# Patient Record
Sex: FEMALE | Race: AMERICAN INDIAN OR ALASKA NATIVE | NOT HISPANIC OR LATINO | Employment: UNEMPLOYED | URBAN - METROPOLITAN AREA
[De-identification: names, ages, dates, MRNs, and addresses within clinical notes are randomized per-mention and may not be internally consistent; named-entity substitution may affect disease eponyms.]

---

## 2021-05-18 ENCOUNTER — OFFICE VISIT (OUTPATIENT)
Dept: DERMATOLOGY | Facility: CLINIC | Age: 56
End: 2021-05-18
Payer: COMMERCIAL

## 2021-05-18 VITALS — WEIGHT: 123 LBS | BODY MASS INDEX: 23.22 KG/M2 | HEIGHT: 61 IN

## 2021-05-18 DIAGNOSIS — L81.9 HYPOPIGMENTATION: Primary | ICD-10-CM

## 2021-05-18 DIAGNOSIS — L21.9 SEBORRHEIC DERMATITIS: ICD-10-CM

## 2021-05-18 PROCEDURE — 99204 OFFICE O/P NEW MOD 45 MIN: CPT | Performed by: STUDENT IN AN ORGANIZED HEALTH CARE EDUCATION/TRAINING PROGRAM

## 2021-05-18 RX ORDER — KETOCONAZOLE 20 MG/G
CREAM TOPICAL
Qty: 60 G | Refills: 5 | Status: SHIPPED | OUTPATIENT
Start: 2021-05-18

## 2021-05-18 RX ORDER — LEVOCETIRIZINE DIHYDROCHLORIDE 5 MG/1
5 TABLET, FILM COATED ORAL
COMMUNITY
Start: 2021-03-15

## 2021-05-18 RX ORDER — ESTRADIOL 0.1 MG/G
1 CREAM VAGINAL
COMMUNITY
Start: 2020-08-20 | End: 2021-08-20

## 2021-05-18 RX ORDER — FLUTICASONE PROPIONATE 50 MCG
SPRAY, SUSPENSION (ML) NASAL
COMMUNITY
Start: 2021-04-19

## 2021-05-18 RX ORDER — TACROLIMUS 1 MG/G
OINTMENT TOPICAL
Qty: 100 G | Refills: 5 | Status: SHIPPED | OUTPATIENT
Start: 2021-05-18

## 2021-05-18 RX ORDER — TRIAMCINOLONE ACETONIDE 0.25 MG/G
CREAM TOPICAL
Qty: 80 G | Refills: 0 | Status: SHIPPED | OUTPATIENT
Start: 2021-05-18

## 2021-05-18 RX ORDER — DULOXETIN HYDROCHLORIDE 20 MG/1
CAPSULE, DELAYED RELEASE ORAL
COMMUNITY
Start: 2021-04-19

## 2021-05-18 NOTE — PATIENT INSTRUCTIONS
Assessment and Plan:  Based on a thorough discussion of this condition and the management approach to it (including a comprehensive discussion of the known risks, side effects and potential benefits of treatment), the patient (family) agrees to implement the following specific plan: Favor hypopigmentation not depigmentation, given distribution 2/2 sebborrhea   · We recommend starting to use OTC sunscreen SPF 30+ liberally to minimize difference in skin pigmentation  · Triamcinolone 0 025% cream - use twice daily for 2 weeks  · Start ketoconazole 1% cream- start use daily  · Tacrolimus 0 1% ointment - will start PA process; Use on face once daily once received  Normal to have burning initially with use     · Follow up in 8 weeks   ·

## 2021-05-18 NOTE — PROGRESS NOTES
[facility]     Patient Name: Harshad Valdez  Encounter Date: 05/18/2021     Have you been cared for by a [facility] in the last 3 years and, if so, which one? No    · Have you traveled outside of the [address] [place] [address] in the past 3 months or outside of the [facility] area in the last 2 weeks? No     May we call your Preferred Phone number to discuss your specific medical information? Yes     May we leave a detailed message that includes your specific medical information? Yes      Today's Chief Concerns:   Concern #1:  Spot on face    Concern #2:      Past Medical History:  Have you personally ever had or currently have any of the following? · Skin cancer (such as Melanoma, Basal Cell Carcinoma, Squamous Cell Carcinoma? (If Yes, please provide more detail)- No  · Eczema: No  · Psoriasis: No  · HIV/AIDS: No  · Hepatitis B or C: No  · Tuberculosis: No  · Systemic Immunosuppression such as Diabetes, Biologic or Immunotherapy, Chemotherapy, Organ Transplantation, Bone Marrow Transplantation (If YES, please provide more detail): No  · Radiation Treatment (If YES, please provide more detail): No  · Any other major medical conditions/concerns? (If Yes, which types)- No    Social History:     What is/was your primary occupation? Unemployed      What are your hobbies/past-times? Walking     Family History:  Have any of your "first degree relatives" (parent, brother, sister, or child) had any of the following       · Skin cancer such as Melanoma or Merkel Cell Carcinoma or Pancreatic Cancer? No  · Eczema, Asthma, Hay Fever or Seasonal Allergies: No  · Psoriasis or Psoriatic Arthritis: No  · Do any other medical conditions seem to run in your family? If Yes, what condition and which relatives?   No    Current Medications:       Current Outpatient Medications:     DULoxetine (Cymbalta) 20 mg capsule, 1 po every other day, Disp: , Rfl:     estradiol (ESTRACE) 0 1 mg/g vaginal cream, Insert 1 g into the vagina, Disp: , Rfl:     fluticasone (FLONASE) 50 mcg/act nasal spray, 2 sprays each nostril as needed for allergies, Disp: , Rfl:     levocetirizine (XYZAL) 5 MG tablet, Take 5 mg by mouth, Disp: , Rfl:       Review of Systems:  Have you recently had or currently have any of the following? If YES, what are you doing for the problem? · Fever, chills or unintended weight loss: No  · Sudden loss or change in your vision: No  · Nausea, vomiting or blood in your stool: No  · Painful or swollen joints: No  · Wheezing or cough: No  · Changing mole or non-healing wound: No  · Nosebleeds: No  · Excessive sweating: No  · Easy or prolonged bleeding? No  · Over the last 2 weeks, how often have you been bothered by the following problems? · Taking little interest or pleasure in doing things: 1 - Not at All  · Feeling down, depressed, or hopeless: 1 - Not at All  · Rapid heartbeat with epinephrine:  No    · FEMALES ONLY:    · Are you pregnant or planning to become pregnant? No  · Are you currently or planning to be nursing or breast feeding? No    · Any known allergies? Allergies   Allergen Reactions    Penicillin G Rash   ·       Physical Exam:     Was a chaperone (Derm Clinical Assistant) present throughout the entire Physical Exam? Yes     Did the Dermatology Team specifically  the patient on the importance of a Full Skin Exam to be sure that nothing is missed clinically?  Yes}  o Did the patient ultimately request or accept a Full Skin Exam?  NO  o Did the patient specifically refuse to have the areas "under-the-bra" examined by the Dermatologist? Christi Rose  o Did the patient specifically refuse to have the areas "under-the-underwear" examined by the Dermatologist? YES    CONSTITUTIONAL:   Vitals:    05/18/21 1414   Weight: 55 8 kg (123 lb)   Height: 5' 1" (1 549 m)       PSYCH: Normal mood and affect  EYES: Normal conjunctiva  ENT: Normal lips and oral mucosa  CARDIOVASCULAR: No edema  RESPIRATORY: Normal respirations  HEME/LYMPH/IMMUNO:  No regional lymphadenopathy except as noted below in "ASSESSMENT AND PLAN BY DIAGNOSIS"    SKIN:  FULL ORGAN SYSTEM EXAM  Hair, Scalp, Ears, Face Normal except as noted below in Assessment     Assessment and Plan by Diagnosis:    History of Present Condition:     Duration:  How long has this been an issue for you?    o  Face    Location Affected:  Where on the body is this affecting you?    o  15 months    Quality:  Is there any bleeding, pain, itch, burning/irritation, or redness associated with the skin lesion?    o  Denies    Severity:  Describe any bleeding, pain, itch, burning/irritation, or redness on a scale of 1 to 10 (with 10 being the worst)  o  Denies    Timing:  Does this condition seem to be there pretty constantly or do you notice it more at specific times throughout the day?    o  Denies   Context:  Have you ever noticed that this condition seems to be associated with specific activities you do?    o  Denies    Modifying Factors:    o Anything that seems to make the condition worse?    -  Denies   o What have you tried to do to make the condition better?    -  hydroquinone spf 15    Associated Signs and Symptoms:  Does this skin lesion seem to be associated with any of the following:  o  SL AMB DERM SIGNS AND SYMPTOMS: Prevent you from things you enjoy doing         1  HYPOPIGMENTATION, FAVOR 2/2 SEBORRHEA  Physical Exam:   Anatomic Location Affected:  NOSE; AND BILATERAL NLF AND BILATERAL SUPRABROWS   Morphological Description: hypopigmented patches, accentuated with [place] Kick lamp   Pertinent Positives:   Pertinent Negatives: Additional History of Present Condition:  Patient notes presence for a few months, no family hx of vitiligo, no lightening patches anywhere else  Has tried treatment with hydroquinone Rx'ed by other physician (4%)       Assessment and Plan:  Based on a thorough discussion of this condition and the management approach to it (including a comprehensive discussion of the known risks, side effects and potential benefits of treatment), the patient (family) agrees to implement the following specific plan: Favor hypopigmentation not depigmentation, given distribution  2/2 sebborrhea   · We recommend starting to use OTC sunscreen SPF 30+ liberally to minimize difference in skin pigmentation  · Triamcinolone 0 025% cream - use twice daily for 2 weeks,   · Start ketoconazole 1% cream- start use daily  · Tacrolimus 0 1% ointment - will start PA process; Use on face once daily  Normal to have burning initially with use     · Follow up in 8 weeks     Imaged today in clinic    Scribe Attestation    I,:  Kevin Reyes MA am acting as a scribe while in the presence of the attending physician :       I,:  Karl Carrillo MD personally performed the services described in this documentation    as scribed in my presence :

## 2021-07-06 ENCOUNTER — OFFICE VISIT (OUTPATIENT)
Dept: DENTISTRY | Facility: CLINIC | Age: 56
End: 2021-07-06

## 2021-07-06 VITALS — TEMPERATURE: 98.8 F | DIASTOLIC BLOOD PRESSURE: 56 MMHG | SYSTOLIC BLOOD PRESSURE: 124 MMHG | HEART RATE: 65 BPM

## 2021-07-06 DIAGNOSIS — Z01.21 ENCOUNTER FOR DENTAL EXAMINATION AND CLEANING WITH ABNORMAL FINDINGS: Primary | ICD-10-CM

## 2021-07-06 PROCEDURE — D0210 INTRAORAL - COMPLETE SERIES OF RADIOGRAPHIC IMAGES: HCPCS

## 2021-07-06 PROCEDURE — D0150 COMPREHENSIVE ORAL EVALUATION - NEW OR ESTABLISHED PATIENT: HCPCS | Performed by: DENTIST

## 2021-07-06 PROCEDURE — D1330 ORAL HYGIENE INSTRUCTIONS: HCPCS

## 2021-07-06 NOTE — PATIENT INSTRUCTIONS
Mouth Care   WHAT YOU NEED TO KNOW:   Mouth care prevents infection, plaque, bleeding gums, mouth sores, and cavities  It also freshens breath and improves appetite  Do mouth care in the morning, after each meal, and before bed each night  You may need more frequent mouth care if your mouth is in poor condition  DISCHARGE INSTRUCTIONS:   Items used for mouth care:   · An electric or manual toothbrush    · Toothpaste, dental sticks, and floss    · A cup of water for rinsing    · Mouthwash     · Water-based lip balm or moisturizer    Brush your teeth:   · Wet the toothbrush and place a small amount of toothpaste on it  · Gently place the brush on each tooth, and move it in a [place]  Do not press too hard  This may injure your gums  · Clean the inner, outer, and top surfaces of your teeth  Brush your gums and the top of your tongue  · Swish the water in your mouth and spit it out  Repeat this step with mouthwash  · Dry around your mouth  Apply water-based lip balm or moisturizer to your lips to prevent cracking and dryness  Floss your teeth:  Thread the floss between each tooth, but do not push down on the gums too hard  This can cause gum damage  Make sure to floss on each side of every tooth  You may need to use waxed floss for easier movement between your teeth  Clean your dentures:  Remove the dentures from your mouth before you clean them  Moist dentures come out more easily  Drink a sip of water before you remove your dentures  Gently rock the dentures from side to side to loosen them  Then pull them out  · Brush the dentures with clean water and denture  or toothpaste  · Brush the dentures on all surfaces with cool water  Do not use hot water  Hot water could damage the dentures  Be careful not to bend any clasps on the dentures as you brush them  Rinse them  Place a thin layer of denture adhesive on the dentures and put them back in your mouth   Ask your healthcare provider which adhesive to use  · Soak the dentures in a denture solution each night after you brush them  Rinse them in cold water before you place them back in your mouth  Follow up with your healthcare provider or dentist every 6 months or as directed:  Write down your questions so you remember to ask them during your visits  Contact your healthcare provider or dentist if:   · You develop mouth sores  · Your dentures do not fit well  · You have pain with brushing  · You have questions or concerns about your condition or care  © Copyright [address] Information is for End User's use only and may not be sold, redistributed or otherwise used for commercial purposes  All illustrations and images included in CareNotes® are the copyrighted property of A D A M , Inc  or [facility] Janessa Roldan   The above information is an  only  It is not intended as medical advice for individual conditions or treatments  Talk to your doctor, nurse or pharmacist before following any medical regimen to see if it is safe and effective for you

## 2021-07-06 NOTE — PROGRESS NOTES
New Patient Exam     Exams:  Comprehensive exam and spot probed  Xrays:     FMX    Type of Treatment:     Reviewed OHI  Brush:  2X/day and Floss 1X/day  EO/OCS Exams:  No significant findings  IO: No significant findings  Oral Hygiene: Fair   Gingiva:  Pink    Stain:  Light    Perio Charting:  Spot Probing was completed and evaluated  <3  mm  Periocharting was not completed  Caries Findings:  None  Caries Risk Assessment:    Low   caries risk    Treatment Plan:  Updated   Dr  Exam:  Dr Hien Cervantes  Referral:  No referral given   NV:  Adult Prophy  NVV:  Initial Impressions for Veneers ([address])    Patient says she will be attending a wedding in mid-August which is why she wants to have the veneers completed  Teeth are in very good condition but patient is unhappy with appearance of teeth due to recession which has caused interproximal spaces between 7-8, 8-9, 9-10  Notable recession with bone loss evident on radiographs, however gingiva is currently healthy and no abnormal mobility noted, healthy interproximal cortical plate visible on radiographs  Discussed that there might not be time to complete veneers by then however we would likely be able to complete bleaching before the wedding  Patient understands but would prefer to have veneers

## 2021-08-24 ENCOUNTER — OFFICE VISIT (OUTPATIENT)
Dept: DENTISTRY | Facility: CLINIC | Age: 56
End: 2021-08-24

## 2021-08-24 VITALS — HEART RATE: 50 BPM | TEMPERATURE: 97.5 F | DIASTOLIC BLOOD PRESSURE: 62 MMHG | SYSTOLIC BLOOD PRESSURE: 109 MMHG

## 2021-08-24 DIAGNOSIS — Z01.21 ENCOUNTER FOR DENTAL EXAMINATION AND CLEANING WITH ABNORMAL FINDINGS: Primary | ICD-10-CM

## 2021-08-24 PROCEDURE — D0191 ASSESSMENT OF A PATIENT: HCPCS | Performed by: DENTIST

## 2021-08-24 NOTE — PROGRESS NOTES
Pt presented to [facility] w/ cc: "I want my teeth whitened and veneers "    After review of dental history and radiographic bone loss, perio evaluation was completed at today's appointment  Patient said last Sc/Rp was 5 months ago at an outside dental clinic  Noted recession in the maxillary anterior teeth of 1-2 mm  No sub-gingival calcus present radiographically  Light plaque on lingual of maxillary and mandibular teeth  Localized bleeding on probing present  Pt has chronic periodontal disease localized to the maxillary anterior teeth  Please continue to monitor to ensure that pt periodontium is stable and bone loss is not progressing,     PLAN  Pt does not need veneers  Anterior recession of teeth will place margin on root surface and cause the clinical crown of patient's teeth to look long and disrupt ability to bond to sound tooth surface  Black triangles present in maxillary anterior teeth  Pt's crowns are healthy and absent of caries  Pt needs perio re-eval at 3 month intervals  Radiographic bone loss and recession present from previous perio diagnosis  Pt's periodtontium is stable  Pt will benefit best from prophy  Cavitron tip can extend far enough to eliminate any plaque and calculus because of the recession  Pt wants teeth whitened      DONE TODAY  Perio charting, OHI    NV1: Alginate impressions for whitenning trays and study models  NV2: Delivery whitening trays  NV3: Re-do bridge #12-14 for esthetic concerns to shade match new tooth shade of natural teeth  NV4: Continue perio re-haresh     Done today  Perio eval    NEXT VISIT  Alginate impressions for whitening trays and study models    Dr Adan Chaney

## 2021-11-19 ENCOUNTER — OFFICE VISIT (OUTPATIENT)
Dept: DENTISTRY | Facility: CLINIC | Age: 56
End: 2021-11-19

## 2021-11-19 VITALS — TEMPERATURE: 98.2 F

## 2021-11-19 DIAGNOSIS — Z01.20 ENCOUNTER FOR DENTAL EXAMINATION AND CLEANING WITHOUT ABNORMAL FINDINGS: Primary | ICD-10-CM

## 2021-11-19 PROCEDURE — D9975: HCPCS | Performed by: DENTIST

## 2023-10-09 ENCOUNTER — TELEPHONE (OUTPATIENT)
Age: 58
End: 2023-10-09

## 2023-10-09 NOTE — TELEPHONE ENCOUNTER
Pt called wanting to schedule a follow up with Dr Haile Moore, said she spoke with her over the weekend. First available with Dr Haile Moore 10/16 but pt is leaving for [place] at 3pm    Pt said to ask if Dr Haile Moore can see her this week. Dr Haile Moore, please advise if pt can be seen this week.  Ty

## 2023-10-16 ENCOUNTER — TELEPHONE (OUTPATIENT)
Age: 58
End: 2023-10-16

## 2023-10-16 ENCOUNTER — OFFICE VISIT (OUTPATIENT)
Dept: DERMATOLOGY | Facility: CLINIC | Age: 58
End: 2023-10-16
Payer: COMMERCIAL

## 2023-10-16 VITALS — TEMPERATURE: 97.7 F | BODY MASS INDEX: 22.66 KG/M2 | HEIGHT: 61 IN | WEIGHT: 120 LBS

## 2023-10-16 DIAGNOSIS — L81.1 MELASMA: Primary | ICD-10-CM

## 2023-10-16 PROCEDURE — 99214 OFFICE O/P EST MOD 30 MIN: CPT | Performed by: STUDENT IN AN ORGANIZED HEALTH CARE EDUCATION/TRAINING PROGRAM

## 2023-10-16 NOTE — TELEPHONE ENCOUNTER
Rec'd call from patient requesting return call from Dr. Daniella Bernal or clinical team.    She states that if she has the laser procedure done on 11/30/2023 how long will she be peeling for? How many days? Please return call to [phone].

## 2023-10-16 NOTE — TELEPHONE ENCOUNTER
Patient called to schedule a laser procedure w/ dr Omaira Villafana. She said that dr Omaira Villafana told her to come in 11/30 at 8:30.  Patient was scheduled

## 2023-10-16 NOTE — PROGRESS NOTES
[facility]     Patient Name: Osmany Palafox  Encounter Date: 10/16/2023     Have you been cared for by a [facility] in the last 3 years and, if so, which description applies to you? Yes. I have been here within the last 3 years, and my medical history has NOT changed since that time. I am FEMALE/of child-bearing potential.    REVIEW OF SYSTEMS:  Have you recently had or currently have any of the following? No changes in my recent health. PAST MEDICAL HISTORY:  Have you personally ever had or currently have any of the following? If "YES," then please provide more detail. No changes in my medical history. FAMILY HISTORY:  Any "first degree relatives" (parent, brother, sister, or child) with the following? No changes in my family's known health. PATIENT EXPERIENCE:    Do you want the Dermatologist to perform a COMPLETE skin exam today including a clinical examination under the "bra and underwear" areas? NO  If necessary, do we have your permission to call and leave a detailed message on your Preferred Phone number that includes your specific medical information? Yes      Allergies   Allergen Reactions    Penicillin G Rash      Current Outpatient Medications:     DULoxetine (Cymbalta) 20 mg capsule, 1 po every other day, Disp: , Rfl:     estradiol (ESTRACE) 0.1 mg/g vaginal cream, Insert 1 g into the vagina, Disp: , Rfl:     fluticasone (FLONASE) 50 mcg/act nasal spray, 2 sprays each nostril as needed for allergies, Disp: , Rfl:     ketoconazole (NIZORAL) 2 % cream, Apply on face daily. , Disp: 60 g, Rfl: 5    levocetirizine (XYZAL) 5 MG tablet, Take 5 mg by mouth, Disp: , Rfl:     tacrolimus (PROTOPIC) 0.1 % ointment, Apply on face once daily. , Disp: 100 g, Rfl: 5    triamcinolone (KENALOG) 0.025 % cream, Apply on face twice daily for 2 weeks then stop., Disp: 80 g, Rfl: 0          Whom besides the patient is providing clinical information about today's encounter? NO ADDITIONAL HISTORIAN (patient alone provided history)    Physical Exam and Assessment/Plan by Diagnosis:     FAVOR MELASMA    Physical Exam:  Anatomic Location Affected & Morphological Description: tan irregular patches on the face    Additional History of Present Condition:  Patient present for discoloration of the skin on the face. Has been going on for a few years. No family history of vitiligo. Seen by us in 2021, thought to possibly be post inflammatory hypopigmentation 2/2 seb derm given location of involvement. Pt notes she did not use creams prescribed at time and has since thrown them out. She was worried about safety of using them on face. Has been using Triluma Rx'ed by another derm and notes this helps but she reports flaring/darkening when she stops. Denies scaling of face. Hormones/OCPs? none  Pregnancy? No, post menopausal  SPF/UPF use? Yes, daily use of 40-50  Treatments tried: Hydroquinone, Triluma, ketoconazole 2% cream, tacrolimus, 0.1% ointment, triamcinolone 0.025% cream, OTC sunscreen    Assessment and Plan:  Based on a thorough discussion of this condition and the management approach to it (including a comprehensive discussion of the known risks, side effects and potential benefits of treatment), the patient (family) agrees to implement the following specific plan:    -DDX: Includes post-inflamamtory hypopigmentation (seb derm mdiscussed given distribtion alogn b/l eyeborws, NLFs that is accentuated on exam with Jaqueline Candelaria light), did discuss vitiligo however she does not appear to have de-pigmentation  -Discussed will plan to cont to treat as melasma given she notes improvement with Triluma. Discussed diagnosis, etiology, and prognosis - dyschromias are very difficult to treat and improve slowly. Treatment reviewed in detail with patient. All questions answered.   -Sunscreen and sun protection are the #1 treatments in melasma.  Emphasized importance of sunscreen use, every single day, regardless of weather or outdoor plans. Sunscreen with mineral blockers (Zinc, Titanium) are best.  -Recommend Avene Mineral High Protection Tinted Compact SPF 50 or other iron oxide containing physical based sunscreen for daily use, apply every morning. It contains > 3% iron oxide, a sufficient amount of this important ingredient has been shown to best protect against worsening of melasma/hyperpigmentation, especially in combination with the mineral sunscreen. Most other tinted sunscreens do not offer this high percentage of iron oxide (which gives it an opaque tint). See skin care regimen below for other options of sunscreens.    -Recommend continuing use of a tretinoin/hydroquinone (HQ)-containing cream once daily two months on, two month off and start  use of a tretinoin/tranexamic acid containing cream once daily during the "off months." Side effects of hydroquinone reviewed including, but not limited to, erythema, irritation, paradoxical ochronsis and innefficacy. Side effects to tretinoin include but are not limited to dryness, peeling, irritation, redness, and photosensitivity discussed. Do not use when pregnant or breastfeeding. Stressed importance of strict photoprotection and photo avoidance. Sig as below:    -Start using TRANEXAMIC ACID 5%/NIACINAMIDE 4%/KOJIC ACID 2%/TRETINOIN 0.025% CREAM -Apply daily at bedtime for two months and alternate between using your Melvinia Ruben  -Stop using your Triluma for 2 months while using your TRANEXAMIC ACID 5%/NIACINAMIDE 4%/KOJIC ACID 2%/TRETINOIN 0.025% CREAM -alternate every 2 months    The tranexamic acid cream will be compounded and sent to [address] [place] [address]-  compounding pharmacy that will call you or text you and then ship the medication directly to you. You should be contacted within a few days by the pharmacy as discussed via phone, text, or email. They will take your payment information & your medication will be mailed to you.      If you need to contact the pharmacy, their contact information is as follows: [address] [phone].     - Discussed laser that we could use in conjunction with above. Quoted price for qs-NdYAG laser: 1 treatment is $500.00, 3 treatments would be $1,200.00. Can do at [facility] or  location. Call to schedule if desired. Recommend follow up in 2 months. SKIN CARE RECOMMENDATIONS     Morning    Wash your face with a gentle cleanser. Some examples are listed below but get one that works for you. Avoid ones with harsh scrubbing beads:  Cerave hydrating facial cleanser  Cetaphil gentle cleanser   LaRoche-Posay hydrating gentle cleanser  Neutrogena ultra gentle hydrating cleanser    2. Apply Vitamin C. Look for "Ascorbic acid" as an ingredient. This is the most effective form of Vitamin C. Some examples are listed below but get one that works for you. Cerave Skin Renewing Vitamin C Serum:   LaRoche-Posay vitamin C serum   ISDIN flavo-C Ultraglican Ampoules  PCA SKIN Vitamin C-Quench Antioxidant Hydrating Facial Serum (formerly known as PCA SKIN C Quench Antioxidant Serum)  Skinceuticals CE Ferulic or Phloretin CF  Vichy ampoules  Avene antioxidant  L’Oreal vitamin C    3. Please continue to use your moisturizer with SPF 30. Some sunscreen we recommend are:   Sunblocks with iron oxide (not an all inclusive list!)  Avene Mineral High Protection Tinted Compact   Isdinceutics Mineral Brush   Neutrogena Purescreen+ Tinted Mineral Sunscreen- $24.99  UV Elements Tinted Broad-Spectrum SPF 44 -- $39.00  Unsun Cosmetics Mineral Tinted Broad Spectrum Face Sunscreen SPF 30 -- $29.00  SkinCeuticals Physical Fusion UV Defense SPF 50 -- $36.00  Neostrata Sheer physical protection SPF 50  Exuviance sheer daily protection SPF 48  Supergoop!  Daily Correct CC Cream SPF35  SkinMedica Essential Defense Mineral Shield SPF 32  Dermalogica Light Sheer Tint Moisture SPF 20 Face Moisturiser  Tizo 3 Tinted Face Mineral SPF 40 Sunscreen  Cotz Face Natural Skin Tone SPF 40  [facility] Mineral Crème Broad Spectrum SPF 50 Sunscreen    4. Follow with make-up as needed     Night    Wash your face with a gentle cleanser. Some examples are listed below but get one that works for you. Avoid ones with harsh scrubbing beads:  Cerave hydrating facial cleanser  Cetaphil gentle cleanser   LaRoche-Posay hydrating gentle cleanser              d     Neutrogena ultra gentle hydrating cleanser    2. Follow with an oil free moisturizer. Apply an oil free moisturizer. Some examples are listed below but get one that works for you. A. Neutrogena Hydroboost for EXTRADRY skin   B. The Ordinary Natural Moisturizing Factors FRANKLIN   C. First Aid Beauty Hydrafirm Night Cream  D. Alastin Ultra nourishing Moisturizer    3. Pat dry your face, and then apply a pea-sized amount of your lightening cream compounded with tretinoin (hydroquinone or tranexamic acid cream that you will be alternating every two months) - an even thin layer on your face. Can be drying. Avoid the eye area and the curves around your nose and corners of your lips. Start as follows: Week 1-2: 2 nights/wk. Week 3: 3 nights per week. If too irritating, take a night or two off.     4. Can add a second layer of moisturizer if you find you feel your skin is dry.

## 2023-10-16 NOTE — PATIENT INSTRUCTIONS
COSMETIC CONSULT, MELASMA  -Discussed will plan to cont to treat as melasma given she notes improvement with Triluma. Discussed diagnosis, etiology, and prognosis - dyschromias are very difficult to treat and improve slowly. Treatment reviewed in detail with patient. All questions answered.   -Sunscreen and sun protection are the #1 treatments in melasma. Emphasized importance of sunscreen use, every single day, regardless of weather or outdoor plans. Sunscreen with mineral blockers (Zinc, Titanium) are best.  -Recommend Avene Mineral High Protection Tinted Compact SPF 50 or other iron oxide containing physical based sunscreen for daily use, apply every morning. It contains > 3% iron oxide, a sufficient amount of this important ingredient has been shown to best protect against worsening of melasma/hyperpigmentation, especially in combination with the mineral sunscreen. Most other tinted sunscreens do not offer this high percentage of iron oxide (which gives it an opaque tint). See skin care regimen below for other options of sunscreens.     -Recommend continuing use of a tretinoin/hydroquinone (HQ)-containing cream once daily two months on, two month off and start  use of a tretinoin/tranexamic acid containing cream once daily during the "off months." Side effects of hydroquinone reviewed including, but not limited to, erythema, irritation, paradoxical ochronsis and innefficacy. Side effects to tretinoin include but are not limited to dryness, peeling, irritation, redness, and photosensitivity discussed. Do not use when pregnant or breastfeeding. Stressed importance of strict photoprotection and photo avoidance.  Sig as below:     -Start using TRANEXAMIC ACID 5%/NIACINAMIDE 4%/KOJIC ACID 2%/TRETINOIN 0.025% CREAM -Apply daily at bedtime for two months and alternate between using your Myla Nearing  -Stop using your Triluma for 2 months while using your TRANEXAMIC ACID 5%/NIACINAMIDE 4%/KOJIC ACID 2%/TRETINOIN 0.025% CREAM -alternate every 2 months     The tranexamic acid cream will be compounded and sent to [address] [place] [address]- a compounding pharmacy that will call you or text you and then ship the medication directly to you. You should be contacted within a few days by the pharmacy as discussed via phone, text, or email. They will take your payment information & your medication will be mailed to you. If you need to contact the pharmacy, their contact information is as follows: [address] [phone].      - Discussed laser that we could use in conjunction with above. Quoted price for qs-NdYAG laser: 1 treatment is $500.00, 3 treatments would be $1,200.00. Can do at [facility] or  location. Call to schedule if desired. Recommend follow up in 2 months. SKIN CARE RECOMMENDATIONS     Morning    Wash your face with a gentle cleanser. Some examples are listed below but get one that works for you. Avoid ones with harsh scrubbing beads:  Cerave hydrating facial cleanser  Cetaphil gentle cleanser   LaRoche-Posay hydrating gentle cleanser  Neutrogena ultra gentle hydrating cleanser    2. Apply Vitamin C. Look for "Ascorbic acid" as an ingredient. This is the most effective form of Vitamin C. Some examples are listed below but get one that works for you. Cerave Skin Renewing Vitamin C Serum:   LaRoche-Posay vitamin C serum   ISDIN flavo-C Ultraglican Ampoules  PCA SKIN Vitamin C-Quench Antioxidant Hydrating Facial Serum (formerly known as PCA SKIN C Quench Antioxidant Serum)  Skinceuticals CE Ferulic or Phloretin CF  Vichy ampoules  Avene antioxidant  L’Oreal vitamin C    3. Please continue to use your moisturizer with SPF 30.  Some sunscreen we recommend are:   Sunblocks with iron oxide (not an all inclusive list!)  Avene Mineral High Protection Tinted Compact   Isdinceutics Mineral Brush   Neutrogena Purescreen+ Tinted Mineral Sunscreen- $24.99  UV Elements Tinted Broad-Spectrum SPF 44 -- $39.00  Unsun Cosmetics Mineral Tinted Broad Spectrum Face Sunscreen SPF 30 -- $29.00  SkinCeuticals Physical Fusion UV Defense SPF 48 -- $36.00  Neostrata Sheer physical protection SPF 48  Exuviance sheer daily protection SPF 48  Supergoop! Daily Correct CC Cream SPF35  SkinMedica Essential Defense Mineral Shield SPF 32  Dermalogica Light Sheer Tint Moisture SPF 20 Face Moisturiser  Tizo 3 Tinted Face Mineral SPF 40 Sunscreen  Cotz Face Natural Skin Tone SPF 40  MDSolarSciences Mineral Crème Broad Spectrum SPF 50 Sunscreen    4. Follow with make-up as needed     Night    Wash your face with a gentle cleanser. Some examples are listed below but get one that works for you. Avoid ones with harsh scrubbing beads:  Cerave hydrating facial cleanser  Cetaphil gentle cleanser   LaRoche-Posay hydrating gentle cleanser              d     Neutrogena ultra gentle hydrating cleanser    2. Follow with an oil free moisturizer. Apply an oil free moisturizer. Some examples are listed below but get one that works for you. A. Neutrogena Hydroboost for EXTRADRY skin   B. The Ordinary Natural Moisturizing Factors FRANKLIN   C. First Aid Beauty Hydrafirm Night Cream  D. Alastin Ultra nourishing Moisturizer    3. Pat dry your face, and then apply a pea-sized amount of your lightening cream compounded with tretinoin (hydroquinone or tranexamic acid cream that you will be alternating every two months) - an even thin layer on your face. Can be drying. Avoid the eye area and the curves around your nose and corners of your lips. Start as follows: Week 1-2: 2 nights/wk. Week 3: 3 nights per week. If too irritating, take a night or two off.     4. Can add a second layer of moisturizer if you find you feel your skin is dry.

## 2023-10-17 ENCOUNTER — TELEPHONE (OUTPATIENT)
Age: 58
End: 2023-10-17

## 2023-10-17 NOTE — TELEPHONE ENCOUNTER
Closed    The  is not the PA processor for this patient and medication combination.     Payer: [id number] [place] [address]    [phone]  Please contact the members plan for prior authorization request.  Suggested payer for the patient: [employer]  View History    Medication Being Authorized     tretinoin (RETIN-A) 0.025 % cream    -Prior Authorization was submitted to patients insurance for Tretinoin 0.025% cream through [employer]- Key#[id number]

## 2024-03-26 ENCOUNTER — TELEPHONE (OUTPATIENT)
Age: 59
End: 2024-03-26

## 2024-03-26 NOTE — TELEPHONE ENCOUNTER
Received call from patient asking for the address to her April 1st appt.    The address for [facility] was provided and patient stated that she wrote it down.

## 2024-04-01 ENCOUNTER — PROCEDURE VISIT (OUTPATIENT)
Dept: DERMATOLOGY | Facility: CLINIC | Age: 59
End: 2024-04-01

## 2024-04-01 ENCOUNTER — OFFICE VISIT (OUTPATIENT)
Dept: DERMATOLOGY | Facility: CLINIC | Age: 59
End: 2024-04-01
Payer: COMMERCIAL

## 2024-04-01 DIAGNOSIS — L81.1 MELASMA: ICD-10-CM

## 2024-04-01 DIAGNOSIS — R21 RASH AND NONSPECIFIC SKIN ERUPTION: ICD-10-CM

## 2024-04-01 DIAGNOSIS — Z41.1 ENCOUNTER FOR COSMETIC LASER PROCEDURE: Primary | ICD-10-CM

## 2024-04-01 DIAGNOSIS — D48.5 NEOPLASM OF UNCERTAIN BEHAVIOR OF SKIN: Primary | ICD-10-CM

## 2024-04-01 DIAGNOSIS — L92.0 GRANULOMA ANNULARE: ICD-10-CM

## 2024-04-01 PROCEDURE — 88313 SPECIAL STAINS GROUP 2: CPT | Performed by: STUDENT IN AN ORGANIZED HEALTH CARE EDUCATION/TRAINING PROGRAM

## 2024-04-01 PROCEDURE — 88305 TISSUE EXAM BY PATHOLOGIST: CPT | Performed by: STUDENT IN AN ORGANIZED HEALTH CARE EDUCATION/TRAINING PROGRAM

## 2024-04-01 PROCEDURE — 88341 IMHCHEM/IMCYTCHM EA ADD ANTB: CPT | Performed by: STUDENT IN AN ORGANIZED HEALTH CARE EDUCATION/TRAINING PROGRAM

## 2024-04-01 PROCEDURE — COSCON COSMETIC CONSULTATION: Performed by: STUDENT IN AN ORGANIZED HEALTH CARE EDUCATION/TRAINING PROGRAM

## 2024-04-01 PROCEDURE — 88342 IMHCHEM/IMCYTCHM 1ST ANTB: CPT | Performed by: STUDENT IN AN ORGANIZED HEALTH CARE EDUCATION/TRAINING PROGRAM

## 2024-04-01 PROCEDURE — 11104 PUNCH BX SKIN SINGLE LESION: CPT | Performed by: STUDENT IN AN ORGANIZED HEALTH CARE EDUCATION/TRAINING PROGRAM

## 2024-04-01 PROCEDURE — 88312 SPECIAL STAINS GROUP 1: CPT | Performed by: STUDENT IN AN ORGANIZED HEALTH CARE EDUCATION/TRAINING PROGRAM

## 2024-04-01 NOTE — PROGRESS NOTES
"[facility] Note     Patient Name: Graham Fields  Encounter Date: 4/1/2024     Have you been cared for by a [facility] Dermatologist in the last 3 years and, if so, which description applies to you?    Yes.  I have been here within the last 3 years, and my medical history has NOT changed since that time.  I am FEMALE/of child-bearing potential.    REVIEW OF SYSTEMS:  Have you recently had or currently have any of the following? No changes in my recent health.   PAST MEDICAL HISTORY:  Have you personally ever had or currently have any of the following?  If \"YES,\" then please provide more detail. No changes in my medical history.   HISTORY OF IMMUNOSUPPRESSION: Do you have a history of any of the following:  Systemic Immunosuppression such as Diabetes, Biologic or Immunotherapy, Chemotherapy, Organ Transplantation, Bone Marrow Transplantation?  No     Answering \"YES\" requires the addition of the dotphrase \"IMMUNOSUPPRESSED\" as the first diagnosis of the patient's visit.   FAMILY HISTORY:  Any \"first degree relatives\" (parent, brother, sister, or child) with the following?    No changes in my family's known health.   PATIENT EXPERIENCE:    Do you want the Dermatologist to perform a COMPLETE skin exam today including a clinical examination under the \"bra and underwear\" areas?  NO  If necessary, do we have your permission to call and leave a detailed message on your Preferred Phone number that includes your specific medical information?  Yes      Allergies   Allergen Reactions    Penicillin G Rash      Current Outpatient Medications:     CALCIUM-VITAMIN D PO, Take 1 tablet by mouth 2 (two) times a day, Disp: , Rfl:     DULoxetine (Cymbalta) 20 mg capsule, 1 po every other day, Disp: , Rfl:     estradiol (ESTRACE) 0.1 mg/g vaginal cream, Insert 1 g into the vagina, Disp: , Rfl:     fluticasone (FLONASE) 50 mcg/act nasal spray, 2 sprays each nostril as needed for allergies, Disp: , Rfl:     ketoconazole " (NIZORAL) 2 % cream, Apply on face daily. (Patient not taking: Reported on 10/16/2023), Disp: 60 g, Rfl: 5    levocetirizine (XYZAL) 5 MG tablet, Take 5 mg by mouth, Disp: , Rfl:     tacrolimus (PROTOPIC) 0.1 % ointment, Apply on face once daily. (Patient not taking: Reported on 10/16/2023), Disp: 100 g, Rfl: 5    tretinoin (RETIN-A) 0.025 % cream, Apply daily at bedtime for two months and alternate between using Triluma, Disp: 45 g, Rfl: 3    triamcinolone (KENALOG) 0.025 % cream, Apply on face twice daily for 2 weeks then stop. (Patient not taking: Reported on 10/16/2023), Disp: 80 g, Rfl: 0        Whom besides the patient is providing clinical information about today's encounter?   NO ADDITIONAL HISTORIAN (patient alone provided history)    Physical Exam and Assessment/Plan by Diagnosis:    POST BOTOX LID PTOSIS  Physical Exam:  Anatomic Location Affected &Morphological Description:  left ptotic upper eyelid covering upper iris and portion of pupil  Pertinent Positives:  Pertinent Negatives:    Additional History of Present Condition:  PATIENT HAD BOTOX DONE 3 WEEKS AGO AT ANOTHER PRACTICE. SHE HAS PTOSIS OF THE LEFT EYELID.     Assessment and Plan:  Based on a thorough discussion of this condition and the management approach to it (including a comprehensive discussion of the known risks, side effects and potential benefits of treatment), the patient (family) agrees to implement the following specific plan:  Discussed as botox wears off this should start to improve.   Advised starting daily massage with back of electric toothbrush to help botox wear away faster  Discussed she can consider eyedrops to help elevate eyelid slightly- aproclonidine drops recommended. Advised she also reach out to the office that performed the procedure so she can further discuss with them or at least let them know that this occurred so they are aware for any future treatments.    FAVOR MELASMA    Physical Exam:  Anatomic Location  Affected & Morphological Description:  tan patches on lower face with lighter patches periorally  Pertinent Positives:  Pertinent Negatives:    Additional History of Present Condition:  Patient had another consult from someone else. She would like another consult today regarding the melasma. She notes she was prescribed a few things , but hasn't taken anything as she wanted Dr. Hernandez's opinion again. She did not use the tranexamic acid cream that was prescribed at last visit on 10/16/2023. Not using Triluma currently but has in the past. She has not started the oral tranexamic acid given to her by Dr. Conde.     Hormones/OCPs? none  Pregnancy? No, post menopausal  SPF/UPF use? Yes, daily use of 40-50  Treatments tried: Hydroquinone, Triluma, ketoconazole 2% cream, tacrolimus, 0.1% ointment, triamcinolone 0.025% cream, OTC sunscreen    Assessment and Plan:  Based on a thorough discussion of this condition and the management approach to it (including a comprehensive discussion of the known risks, side effects and potential benefits of treatment), the patient (family) agrees to implement the following specific plan:  Re-discussed using topical compounded lightening cream containing tranexamic acid.   Okay to start oral tranexamic acid she has at home that was Rx'ed by outside physician as this should help.   Neutrogena Daily Defense SPF 50+ at least three times a day  Re-discussed laser- pt deferred today. Qs-Nd:YAG (1 treatment = $500; 3 treatments= $1200)    What is melasma?  Melasma is a chronic skin disorder that results in symmetrical, blotchy, brownish facial pigmentation. It can lead to considerable embarrassment and distress.  This form of facial pigmentation is sometimes called chloasma, but as this means green skin, the term melasma (brown skin) is preferred.    Who gets melasma?  Melasma is more common in women than in men; only 1-in-4 to 1-in-20 affected individuals are male, depending on the population  studied. It generally starts between the age of 20 and 40 years, but it can begin in childhood or not until middle age.  Melasma is more common in people that tan well or have naturally brown skin (Strauss skin types 3 and 4) compared with those who have fair skin (skin types 1 and 2) or black skin (skin types 5 or 6).    What causes melasma?  The cause of melasma is complex. The pigmentation is due to overproduction of melanin by the pigment cells, melanocytes, which is taken up by the keratinocytes (epidermal melanosis) and/or deposited in the dermis (dermal melanosis, melanophages). There is a genetic predisposition to melasma, with at least one-third of patients reporting other family members to be affected. In most people melasma is a chronic disorder.  Known triggers for melasma include:  Sun exposure and sun damage--this is the most important avoidable risk factor   Pregnancy--in affected women, the pigment often fades a few months after delivery   Hormone treatments--oral contraceptive pills containing oestrogen and/or progesterone, hormone replacement, intrauterine devices and implants are a factor in about a quarter of affected women   Certain medications (including new targeted therapies for cancer), scented or deodorant soaps, toiletries and cosmetics--these may cause a phototoxic reaction that triggers melasma, which may then persist long term   Hypothyroidism (low levels of circulating thyroid hormone)    Melasma commonly arises in healthy, non-pregnant adults. Lifelong sun exposure causes deposition of pigment within the dermis and this often persists longterm. Exposure to ultraviolet radiation (UVR) deepens the pigmentation because it activates the melanocytes to produce more melanin.    Research is attempting to pinpoint the roles of stem cell, neural, vascular and local hormonal factors in promoting melanocyte activation.    What are the clinical features of melasma?  Melasma presents as  macules (freckle-like spots) and larger flat brown patches.These are found on both sides of the face and have an irregular border. There are several distinct patterns.  Centrofacial pattern: forehead, cheeks, nose and upper lips   Malar pattern: cheeks and nose   Lateral cheek pattern   Mandibular pattern: jawline   Reddened or inflamed forms of melasma (also called erythrosis pigmentosa faciei)   Poikiloderma of [place]: reddened, photoaging changes seen on the sides of the neck, mostly affecting patients older than 50 years   Brachial type of melasma affecting shoulders and upper arms (also called acquired brachial cutaneous dyschromatosis).    Melasma is sometimes  into epidermal (skin surface), dermal (deeper) and mixed types. A Wood lamp that emits black light (UVA1) may be used to identify the depth of the pigment.  Some general classifications include the following:    Epidermal melasma  Well-defined border   Dark brown colour   Appears more obvious under black light   Responds well to treatment    Dermal melasma  Ill-defined border   Light brown or bluish in colour   Unchanged under black light   Responds poorly to treatment\    Mixed melasma  The most common type   Combination of bluish, light and dark brown patches   Mixed pattern seen under black light   Partial improvement with treatment    What is the treatment of melasma?  Melasma can be very slow to respond to treatment, especially if it has been present for a long time. Treatment may result in irritant contact dermatitis in patients with sensitive skin, and this can result in post-inflammatory pigmentation.  Generally a combination of the following measures is helpful.    General measures  Discontinue hormonal contraception.   Year-round life-long sun protection. Wear a broad-brimmed hat.   Use broad-spectrum very high protection factor (SPF 50+) sunscreen applied to the whole face daily, year-round. It should be reapplied every 2 hours if  outdoors during the summer months. Sunscreens containing iron oxides are preferred, as they screen out some visible light as well as ultraviolet radiation. Alternatively or as well, use a make-up that contains sunscreen.   Use a mild cleanser, and if the skin is dry, a light moisturiser.   Cosmetic camouflage (make-up) is invaluable to disguise the pigment.    Topical therapy  Tyrosinase inhibitors are the mainstay of treatment. The aim is to prevent new pigment formation by inhibiting formation of melanin by the melanocytes.  Hydroquinone 2-4% as cream or lotion, applied accurately to pigmented areas at night for 2-4 months. This may cause contact dermatitis (stinging and redness) in 25% of patients. It should not be used in higher concentration or for prolonged courses as it has been associated with ochronosis (a bluish grey discolouration similar to that seen in alkaptonuria).   Azelaic acid cream, lotion or gel can be applied twice daily long term, and is safe in pregnancy. This may also sting.   Kojic acid or kojic acid dipalmitate is often included in formulations, as it binds copper, required by L-DOPA (a cofactor of tyrosinase). Kojic acid can cause irritant contact dermatitis and less commonly, allergic contact dermatitis.   The mechanism of action of cysteamine cream is unclear, but is thought to involve inhibition of tyrosinase. A study of 50 patients with melasma found cysteamine cream to be significantly more effective than placebo cream.   Ascorbic acid (vitamin C) also acts through copper to inhibit pigment production. It is well tolerated but highly unstable, so is usually combined with other agents.   Methimazole (antithyroid drug) cream has been reported to reduce melanin synthesis and pigmentation in hydroquinone-resistant melasma.   New agents under investigation include zinc sulfate mequinol, arbutin and deoxyarbutin (from berries), licorice extract, rucinol, resveratrol, 4-hydroxy-anisole,  2,5-dimethyl-4-hydroxy-3(2H)-furanone and/or N-acetyl glucosamine    Other active compounds used for melasma include:  Topical corticosteroids such as hydrocortisone. These work quickly to fade the colour and reduce the likelihood of contact dermatitis caused by other agents. Potent topical steroids are best avoided due to their potential to cause adverse effects.   Soybean extract, which is thought to reduce the transfer of pigment from melanocytes to skin cells (keratinocytes) and to inhibit receptors.   Tranexamic acid has been used experimentally for melasma as a cream or injected into the skin (mesotherapy), showing some benefit. It may cause allergy or irritation.    Superficial or epidermal pigment can be peeled off. Peeling can also allow tyrosinase inhibitors to penetrate more effectively. These must be done carefully as peels may also induce post-inflammatory pigmentation.  Topical alpha hydroxyacids including glycolic acid and lactic acid, as creams or as repeated superficial chemical peels, remove the surface skin and their low pH inhibits the activity of tyrosinase.   Topical retinoids, such as tretinoin (a prescription medicine) are effective. Tretinoin can be hard to tolerate and sometimes causes contact dermatitis. Do not use during pregnancy.   Salicylic acid, a common peeling ingredient in skin creams, can also be used for chemical peels, but it is not very effective in melasma.    The most successful formulation has been a combination of hydroquinone, tretinoin, and moderate potency topical steroid. This has been found to result in improvement or clearance in up to 60-80% of those treated. Many other combinations of topical agents are in common use, as they are more effective than any one alone. However, these products are often expensive.    Oral treatment of melasma  Oral medications for melasma are under investigation, including tranexamic acid. Tranexamic acid is a lysine analogue that  inhibits plasmin and is usually used orally to stop bleeding. It reduces production of prostaglandins, the precursors of tyrosine. In low dose, tranexamic acid has been reported to be effective and safe in the treatment of melasma, providing patients have been carefully selected and are at low risk of thromboembolic disease.  Glutathione is also under investigation as a systemic skin whitening agent, but has potentially serious adverse effects.    Devices used to treat melasma  The ideal treatment for melasma would destroy the pigment, while leaving the cells alone. Unfortunately, this is hard to achieve. Machines can be used to remove epidermal pigmentation but with caution--over-treatment may cause postinflammatory pigmentation. Patients should be pretreated with a tyrosinase inhibitor (see above).  Fractional lasers, Q-switched Nd:YAG lasers and intense pulsed light (IPL) appear to be the most suitable options. Several treatments may be necessary and post-inflammatory hyperpigmentation may complicate recovery.  Carbon dioxide or erbium:YAG resurfacing lasers, pigment lasers (Q-switched dilcia and Alexandrite devices) and mechanical dermabrasion and microdermabrasion should be used with caution in the treatment of melasma.    What is the outcome of treatment of melasma?  Results take time and the above measures are rarely completely successful.  Unfortunately, even in those that get a good result from treatment, pigmentation may reappear on exposure to summer sun and/or because of hormonal factors. New topical and oral agents are being studied and offer hope for effective treatments in the future.]    Scribe Attestation      I,:  Tabitha Adames am acting as a scribe while in the presence of the attending physician.:       I,:  Natalie Hernandez MD personally performed the services described in this documentation    as scribed in my presence.:

## 2024-04-01 NOTE — PROGRESS NOTES
"[facility] Note     Patient Name: Graham Fields  Encounter Date: 4/1/2024     Have you been cared for by a [facility] Dermatologist in the last 3 years and, if so, which description applies to you?    Yes.  I have been here within the last 3 years, and my medical history has NOT changed since that time.  I am FEMALE/of child-bearing potential.    REVIEW OF SYSTEMS:  Have you recently had or currently have any of the following? No changes in my recent health.   PAST MEDICAL HISTORY:  Have you personally ever had or currently have any of the following?  If \"YES,\" then please provide more detail. No changes in my medical history.   HISTORY OF IMMUNOSUPPRESSION: Do you have a history of any of the following:  Systemic Immunosuppression such as Diabetes, Biologic or Immunotherapy, Chemotherapy, Organ Transplantation, Bone Marrow Transplantation?  No     Answering \"YES\" requires the addition of the dotphrase \"IMMUNOSUPPRESSED\" as the first diagnosis of the patient's visit.   FAMILY HISTORY:  Any \"first degree relatives\" (parent, brother, sister, or child) with the following?    No changes in my family's known health.   PATIENT EXPERIENCE:    Do you want the Dermatologist to perform a COMPLETE skin exam today including a clinical examination under the \"bra and underwear\" areas?  NO  If necessary, do we have your permission to call and leave a detailed message on your Preferred Phone number that includes your specific medical information?  Yes      Allergies   Allergen Reactions    Penicillin G Rash      Current Outpatient Medications:     CALCIUM-VITAMIN D PO, Take 1 tablet by mouth 2 (two) times a day, Disp: , Rfl:     DULoxetine (Cymbalta) 20 mg capsule, 1 po every other day, Disp: , Rfl:     estradiol (ESTRACE) 0.1 mg/g vaginal cream, Insert 1 g into the vagina, Disp: , Rfl:     fluticasone (FLONASE) 50 mcg/act nasal spray, 2 sprays each nostril as needed for allergies, Disp: , Rfl:     ketoconazole " "(NIZORAL) 2 % cream, Apply on face daily. (Patient not taking: Reported on 10/16/2023), Disp: 60 g, Rfl: 5    levocetirizine (XYZAL) 5 MG tablet, Take 5 mg by mouth, Disp: , Rfl:     tacrolimus (PROTOPIC) 0.1 % ointment, Apply on face once daily. (Patient not taking: Reported on 10/16/2023), Disp: 100 g, Rfl: 5    tretinoin (RETIN-A) 0.025 % cream, Apply daily at bedtime for two months and alternate between using Triluma, Disp: 45 g, Rfl: 3    triamcinolone (KENALOG) 0.025 % cream, Apply on face twice daily for 2 weeks then stop. (Patient not taking: Reported on 10/16/2023), Disp: 80 g, Rfl: 0        Whom besides the patient is providing clinical information about today's encounter?   NO ADDITIONAL HISTORIAN (patient alone provided history)    Physical Exam and Assessment/Plan by Diagnosis:    RASH  Physical Exam:  (Anatomic Location); (Size and Morphological Description); (Differential Diagnosis):  Left lateral forehead; b/l forehead with skin colored firm papules coalescing in areas into plaques; ddx r/o granulomatous process  Pertinent Positives:  Pertinent Negatives:    Additional History of Present Condition:  Pt notes has been present for over a month, possibly longer. Has seen two other practitioners- was told it was acne by first and given steroid cream(?) per pt which pt feels helped slightly. But since did not resolve pt sought out care elsewhere by Dermatologist who then prescribed her topical azaleic acid. Never picked this up or used this. Notes sometimes she feels a slight itch but overall asymptomatic. Denies any invasive procedures prior in area besides botox at [facility].     Assessment and Plan:  I have discussed with the patient that a sample of skin via a \"skin biopsy” would be potentially helpful to further make a specific diagnosis under the microscope.  Based on a thorough discussion of this condition and the management approach to it (including a comprehensive discussion of the " known risks, side effects and potential benefits of treatment), the patient (family) agrees to implement the following specific plan:    Procedure:  Skin Biopsy.  After a thorough discussion of treatment options and risk/benefits/alternatives (including but not limited to local pain, scarring, dyspigmentation, blistering, possible superinfection, and inability to confirm a diagnosis via histopathology), verbal and written consent were obtained and portion of the rash was biopsied for tissue sample.  See below for consent that was obtained from patient and subsequent Procedure Note.    PROCEDURE NOTE:  PUNCH BIOPSY      Performing Physician:     Anatomic Location; Clinical Description with size (cm); Pre-Op Diagnosis:    Left lateral forehead; b/l forehead with skin colored firm papules coalescing in areas into plaques; r/o granulomatous process     Anesthesia: 2% Xylocaine with epi      Topical anesthesia: None       Indications: To indicate diagnosis and management plan.    Procedure Details     Patient informed of the risks (including bleeding,scaring and infection) and benefits of the procedure explained. Verbal and written informed consent obtained. The area was prepped and draped in the usual fashion. Anesthesia was obtained with 1% lidocaine with epinephrine. The skin was then stretched perpendicular to the skin tension lines and a punch biopsy to an appropriate sampling depth was obtained with a 6 mm punch with a forceps and iris scissors.     Hemostasis was obtained with 6-0 Prolene x 2 sutures (horizontal mattress).     Complications:  None    Specimen has been sent for review by Dermatopathology.    Plan:  1. Instructed to keep the wound dry and covered for 24-48h and clean thereafter.  2. Warning signs of infection were reviewed.    3. Recommended that the patient use acetaminophen as needed for pain  4. Sutures if any should be removed in 7 days at [facility]      Standard post-procedure care has  been explained and has been included in written form within the patient's copy of Informed Consent.    Scribe Attestation      I,:  Tabitha Adames am acting as a scribe while in the presence of the attending physician.:       I,:  Natalie Hernandez MD personally performed the services described in this documentation    as scribed in my presence.:

## 2024-04-05 ENCOUNTER — TELEPHONE (OUTPATIENT)
Age: 59
End: 2024-04-05

## 2024-04-05 NOTE — TELEPHONE ENCOUNTER
Patient called to check status of pathology report. I advised a message would be forwarded to provider to review since it hasn't been finalized yet by the lab.    Best number to contact patient is [phone].

## 2024-04-08 ENCOUNTER — CLINICAL SUPPORT (OUTPATIENT)
Dept: DERMATOLOGY | Facility: CLINIC | Age: 59
End: 2024-04-08

## 2024-04-08 DIAGNOSIS — Z48.02 ENCOUNTER FOR REMOVAL OF SUTURES: Primary | ICD-10-CM

## 2024-04-08 PROCEDURE — 88342 IMHCHEM/IMCYTCHM 1ST ANTB: CPT | Performed by: STUDENT IN AN ORGANIZED HEALTH CARE EDUCATION/TRAINING PROGRAM

## 2024-04-08 PROCEDURE — 88341 IMHCHEM/IMCYTCHM EA ADD ANTB: CPT | Performed by: STUDENT IN AN ORGANIZED HEALTH CARE EDUCATION/TRAINING PROGRAM

## 2024-04-08 PROCEDURE — RECHECK: Performed by: DERMATOLOGY

## 2024-04-08 PROCEDURE — 88312 SPECIAL STAINS GROUP 1: CPT | Performed by: STUDENT IN AN ORGANIZED HEALTH CARE EDUCATION/TRAINING PROGRAM

## 2024-04-08 PROCEDURE — 88305 TISSUE EXAM BY PATHOLOGIST: CPT | Performed by: STUDENT IN AN ORGANIZED HEALTH CARE EDUCATION/TRAINING PROGRAM

## 2024-04-08 PROCEDURE — 88313 SPECIAL STAINS GROUP 2: CPT | Performed by: STUDENT IN AN ORGANIZED HEALTH CARE EDUCATION/TRAINING PROGRAM

## 2024-04-08 NOTE — PROGRESS NOTES
"Suture removal    Date/Time: 4/8/2024 3:30 PM    Performed by: Bernice Campos RN  Authorized by: Barney Olivier MD  Universal Protocol:  Consent: Verbal consent obtained. Written consent not obtained.  Risks and benefits: risks, benefits and alternatives were discussed  Consent given by: patient  Time out: Immediately prior to procedure a \"time out\" was called to verify the correct patient, procedure, equipment, support staff and site/side marked as required.  Timeout called at: 4/8/2024 3:07 PM.  Patient understanding: patient states understanding of the procedure being performed  Patient consent: the patient's understanding of the procedure matches consent given  Procedure consent: procedure consent matches procedure scheduled  Relevant documents: relevant documents not present or verified  Test results: test results not available  Site marked: the operative site was not marked  Radiology Images displayed and confirmed. If images not available, report reviewed: imaging studies not available  Patient identity confirmed: verbally with patient      Patient location:  Clinic  Location:     Laterality:  Left    Location:  Head/neck    Head/neck location: lateral forehead.  Procedure details:     Tools used:  Suture removal kit    Wound appearance:  No sign(s) of infection, good wound healing, clean, moist and pink    Number of sutures removed:  2  Post-procedure details:     Post-removal:  Band-Aid applied (Vaseline applied)    Patient tolerance of procedure:  Tolerated well, no immediate complications  Comments:      Pt advised to use Vaseline and a band aid for 1-2 more weeks. Pt asked if she could use a steamer/get a facial and I advised her not to for at least 2 more weeks until the wound is fully healed.       Before suture removal     After suture removal       "

## 2024-04-11 RX ORDER — TACROLIMUS 1 MG/G
OINTMENT TOPICAL 2 TIMES DAILY
Qty: 60 G | Refills: 2 | Status: SHIPPED | OUTPATIENT
Start: 2024-04-11

## 2024-05-20 ENCOUNTER — TELEPHONE (OUTPATIENT)
Dept: ADMINISTRATIVE | Facility: HOSPITAL | Age: 59
End: 2024-05-20

## 2024-05-20 ENCOUNTER — COSMETIC (OUTPATIENT)
Dept: DERMATOLOGY | Facility: CLINIC | Age: 59
End: 2024-05-20

## 2024-05-20 DIAGNOSIS — L92.0 GRANULOMA ANNULARE: ICD-10-CM

## 2024-05-20 PROCEDURE — REJUVIPLFACE3: Performed by: STUDENT IN AN ORGANIZED HEALTH CARE EDUCATION/TRAINING PROGRAM

## 2024-05-20 RX ORDER — TACROLIMUS 1 MG/G
OINTMENT TOPICAL 2 TIMES DAILY
Qty: 60 G | Refills: 2 | Status: SHIPPED | OUTPATIENT
Start: 2024-05-20

## 2024-05-20 NOTE — TELEPHONE ENCOUNTER
----- Message from Simon WILLSON sent at 5/20/2024 11:52 AM EDT -----  Please start a prior auth for Tacrolimus. Thank you!

## 2024-05-20 NOTE — PROGRESS NOTES
"[facility] Note     Patient Name: Graham Fields  Encounter Date: 5/20/24     Have you been cared for by a [facility] Dermatologist in the last 3 years and, if so, which description applies to you?    Yes.  I have been here within the last 3 years, and my medical history has NOT changed since that time.  I am FEMALE/of child-bearing potential.    REVIEW OF SYSTEMS:  Have you recently had or currently have any of the following? No changes in my recent health.   PAST MEDICAL HISTORY:  Have you personally ever had or currently have any of the following?  If \"YES,\" then please provide more detail. No changes in my medical history.   HISTORY OF IMMUNOSUPPRESSION: Do you have a history of any of the following:  Systemic Immunosuppression such as Diabetes, Biologic or Immunotherapy, Chemotherapy, Organ Transplantation, Bone Marrow Transplantation?  No     Answering \"YES\" requires the addition of the dotphrase \"IMMUNOSUPPRESSED\" as the first diagnosis of the patient's visit.   FAMILY HISTORY:  Any \"first degree relatives\" (parent, brother, sister, or child) with the following?    No changes in my family's known health.   PATIENT EXPERIENCE:    Do you want the Dermatologist to perform a COMPLETE skin exam today including a clinical examination under the \"bra and underwear\" areas?  NO  If necessary, do we have your permission to call and leave a detailed message on your Preferred Phone number that includes your specific medical information?  Yes      Allergies   Allergen Reactions    Penicillin G Rash      Current Outpatient Medications:     CALCIUM-VITAMIN D PO, Take 1 tablet by mouth 2 (two) times a day, Disp: , Rfl:     DULoxetine (Cymbalta) 20 mg capsule, 1 po every other day, Disp: , Rfl:     estradiol (ESTRACE) 0.1 mg/g vaginal cream, Insert 1 g into the vagina, Disp: , Rfl:     fluticasone (FLONASE) 50 mcg/act nasal spray, 2 sprays each nostril as needed for allergies, Disp: , Rfl:     ketoconazole " (NIZORAL) 2 % cream, Apply on face daily. (Patient not taking: Reported on 10/16/2023), Disp: 60 g, Rfl: 5    levocetirizine (XYZAL) 5 MG tablet, Take 5 mg by mouth, Disp: , Rfl:     tacrolimus (PROTOPIC) 0.1 % ointment, Apply on face once daily. (Patient not taking: Reported on 10/16/2023), Disp: 100 g, Rfl: 5    tacrolimus (PROTOPIC) 0.1 % ointment, Apply topically 2 (two) times a day, Disp: 60 g, Rfl: 2    tretinoin (RETIN-A) 0.025 % cream, Apply daily at bedtime for two months and alternate between using Triluma, Disp: 45 g, Rfl: 3    triamcinolone (KENALOG) 0.025 % cream, Apply on face twice daily for 2 weeks then stop. (Patient not taking: Reported on 10/16/2023), Disp: 80 g, Rfl: 0          Whom besides the patient is providing clinical information about today's encounter?   NO ADDITIONAL HISTORIAN (patient alone provided history)    Physical Exam and Assessment/Plan by Diagnosis:    Q-switched Nd:YAG Laser Treatment    5/20/2024  Device: [email or web address] M22    Place of Treatment: [facility]    Surgeon and : Natalie Hernandez MD  Assistant: 1    Treatment number: 1  Site of treatment: Face  Skin type: Strauss 4    Pre-operative Diagnosis:  photo-facial/laser toning  Indications for Procedure:  cosmesis (mild hyperpigmentation)  Post-operative Diagnosis: same as pre-operative    Anesthetic: Ellen cooler     Safety Precautions:  Fire extinguisher persent/Window covered/Staff and patient eyes covered/Warning sign posted     Interim History/Comments:  none  Percent Improvement: N/A    Procedure Note:   After discussing potential procedure related risks including but not limited to pain, purpura, blistering, scarring, discoloration, “footprinting,” recurrence, inefficacy, need for additional treatment, and undesirable cosmetic results, written and verbal consent were obtained.  Patient was brought back to the operating room. Time out was performed.  Patient's name, identification and  intended procedure were verified. Prior to the procedure, the patient cleansed the treatment area. The treatment area was re-cleansed with alcohol. Eye coverings eye shield inserted/placed.      Parameters used for treatment:   Spot size: 8 mm  Fluence: 0.9 J/cm2    Clinical Endpoint: whitening    Tolerance: Patient tolerated the procedure well with no immediate significant complications and left in stable condition.   Complications: none  Other procedures: See below  Photography: yes    Post-op Care: Aftercare was discussed. Continue to ice at home and keep the area moist with a gentle moisturizer. Advised the patient to avoid exercise for the next 24 hours and also to be cautious with sun exposure over the next week. Advised patient to call the office if there is excessive swelling.           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      Interim History/Comments:  none  Percent Improvement: N/A    Parameters: The following filter was used: 590nm    Fluence: 17 J/cm2  Spot size/shape: rectangular 8 x 15mm  Pulse count: 2     Procedure Note:   After discussing potential procedure related risks including but not limited to pain, purpura, blistering, scarring, discoloration, “footprinting,” recurrence, inefficacy, need for additional treatment, and undesirable cosmetic results, written and verbal consent were obtained.  Patient was brought back to the operating room. Time out was performed.   Patient's name, identification and intended procedure were verified. Prior to the procedure, the patient cleansed the treatment area. The treatment area was re-cleansed with alcohol. Eye coverings eye shield inserted/placed. Ultrasound gel was applied to areas of treatment and re-applied as necessary throughout treatment.     Tolerance: Patient tolerated the procedure well with no immediate significant complications and left in stable condition.   Complications: none  Other procedures: See above  Photography: yes    Post-op Care:   As discussed, when we treat pigment in the skin with IPL, gradual darkening of pigmentation will occur. We advised not to rub the treatment area or try to scrub the area away. This is part of the healing process and should start to slough off in three to four days.    Most patients experience minimal to no side effects after this treatment. However, a small percentage (less than 1%) of patients may experience minor side effects such as swelling, slight scabbing, blisters, bruising, hyper/hypopigmentation. Listed below are some simple but important instructions that will help minimize side effects and help you achieve the best possible result from your treatment.    1. Avoid direct/indirect sun exposure. Direct sunlight should be avoided for around four weeks post-treatment. This means no tanning of any kind, including tanning booths or artificial tanning otions. We recommend applying sunblock daily to the treated area, especially facial areas with minimum of 30 SPF. Your sunblock should contain Zinc Oxide and/or Titanium Dioxide as the active ingredients. Make sure to reapply sunblock every 2-3 hours outdoors or in direct sunlight.    2. After a treatment your skin may feel slightly sunburned for up to 24-48 hours. You may use a cold pack for comfort and to relieve swelling as needed. To minimize swelling, you may apply cold compresses to the treatment area for 10 minutes of every hour on  the day of the treatment. Sleeping on your back with your head elevated on the first night post-treatment also helps. Cold Aloe Vera gel works well.  Avoid hot baths or showering for the first 24-48 hours. Call if you notice the sensation of excessive or uncomfortable heat.    3. Very rarely, a small blister or scab may form. Call immediately if you have any blistering. Leave it alone if intact. If skin breaks or a blister opens, apply an over the counter topical antibiotic ointment to the area and reapply regularly to keep area moist and well covered with medication. To reduce the risk of scarring you must not pick at your skin after an IPL treatment.    4. If you experience itching as the skin heals, you may use an over the counter topical hydrocortisone cream. Do not use Retin A products or other irritating products including glycolic acids or other acids until instructed to do so.    5. You may apply make up on the treated area as long as the skin is not broken or blistered.    6. Discontinue use of any Retin A or hydroquinone products 5-7 days prior to your next treatment.    7. Resume a regular skin regimen once the treatment area is back to “normal.”    IPL should be scheduled every three to six weeks (depending on treatment reaction) and can be done for a series of multiple treatments. Please do not hesitate to call our office at [phone] if you have any questions or concerns about your treatments.    Follow-up:  Patient is aware that it will take multiple treatments to treat this condition. Return to clinic for re-treatment in 4-6 weeks.      THIS WAS A COSMETIC TREATMENT AND PATIENT PAID OUT OF POCKET, DO NOT BILL INSURANCE.   AMOUNT PAID: $650 for a package of 3     Scribe Attestation      I,:  Simon Nagy am acting as a scribe while in the presence of the attending physician.:       I,:  Natalie Hernandez MD personally performed the services described in this documentation    as scribed in my  presence.:

## 2024-05-31 ENCOUNTER — TELEPHONE (OUTPATIENT)
Age: 59
End: 2024-05-31

## 2024-05-31 DIAGNOSIS — L92.0 GRANULOMA ANNULARE: Primary | ICD-10-CM

## 2024-06-05 RX ORDER — PIMECROLIMUS 10 MG/G
CREAM TOPICAL 2 TIMES DAILY
Qty: 30 G | Refills: 3 | Status: SHIPPED | OUTPATIENT
Start: 2024-06-05 | End: 2024-07-05

## 2024-06-05 RX ORDER — PIMECROLIMUS 10 MG/G
CREAM TOPICAL 2 TIMES DAILY
Qty: 30 G | Refills: 3 | Status: SHIPPED | OUTPATIENT
Start: 2024-06-05 | End: 2024-06-05

## 2024-07-24 ENCOUNTER — TELEPHONE (OUTPATIENT)
Dept: OTHER | Facility: OTHER | Age: 59
End: 2024-07-24

## 2024-07-25 ENCOUNTER — OFFICE VISIT (OUTPATIENT)
Dept: DERMATOLOGY | Facility: CLINIC | Age: 59
End: 2024-07-25
Payer: COMMERCIAL

## 2024-07-25 VITALS — TEMPERATURE: 97.3 F

## 2024-07-25 DIAGNOSIS — L81.1 MELASMA: ICD-10-CM

## 2024-07-25 DIAGNOSIS — D22.9 NEVUS: Primary | ICD-10-CM

## 2024-07-25 PROCEDURE — 99213 OFFICE O/P EST LOW 20 MIN: CPT | Performed by: DERMATOLOGY

## 2024-07-25 NOTE — PROGRESS NOTES
"[facility] Dermatology Clinic Note     Patient Name: Graham Fields  Encounter Date: 7/25/24     Have you been cared for by a [facility] Dermatologist in the last 3 years and, if so, which description applies to you?    Yes.  I have been here within the last 3 years, and my medical history has NOT changed since that time.  I am FEMALE/of child-bearing potential.    REVIEW OF SYSTEMS:  Have you recently had or currently have any of the following? No changes in my recent health.   PAST MEDICAL HISTORY:  Have you personally ever had or currently have any of the following?  If \"YES,\" then please provide more detail. No changes in my medical history.   HISTORY OF IMMUNOSUPPRESSION: Do you have a history of any of the following:  Systemic Immunosuppression such as Diabetes, Biologic or Immunotherapy, Chemotherapy, Organ Transplantation, Bone Marrow Transplantation?  No     Answering \"YES\" requires the addition of the dotphrase \"IMMUNOSUPPRESSED\" as the first diagnosis of the patient's visit.   FAMILY HISTORY:  Any \"first degree relatives\" (parent, brother, sister, or child) with the following?    No changes in my family's known health.   PATIENT EXPERIENCE:    Do you want the Dermatologist to perform a COMPLETE skin exam today including a clinical examination under the \"bra and underwear\" areas?  Yes  If necessary, do we have your permission to call and leave a detailed message on your Preferred Phone number that includes your specific medical information?  Yes      Allergies   Allergen Reactions    Penicillin G Rash      Current Outpatient Medications:     CALCIUM-VITAMIN D PO, Take 1 tablet by mouth 2 (two) times a day, Disp: , Rfl:     DULoxetine (Cymbalta) 20 mg capsule, 1 po every other day, Disp: , Rfl:     fluticasone (FLONASE) 50 mcg/act nasal spray, 2 sprays each nostril as needed for allergies, Disp: , Rfl:     levocetirizine (XYZAL) 5 MG tablet, Take 5 mg by mouth, Disp: , Rfl:     tretinoin (RETIN-A) 0.025 % " cream, Apply daily at bedtime for two months and alternate between using Triluma, Disp: 45 g, Rfl: 3    estradiol (ESTRACE) 0.1 mg/g vaginal cream, Insert 1 g into the vagina, Disp: , Rfl:     ketoconazole (NIZORAL) 2 % cream, Apply on face daily. (Patient not taking: Reported on 10/16/2023), Disp: 60 g, Rfl: 5    pimecrolimus (ELIDEL) 1 % cream, Apply topically 2 (two) times a day, Disp: 30 g, Rfl: 3    triamcinolone (KENALOG) 0.025 % cream, Apply on face twice daily for 2 weeks then stop. (Patient not taking: Reported on 10/16/2023), Disp: 80 g, Rfl: 0          Whom besides the patient is providing clinical information about today's encounter?   NO ADDITIONAL HISTORIAN (patient alone provided history)    Physical Exam and Assessment/Plan by Diagnosis:    MELASMA   Physical Exam:  Anatomic Location & Morphological Description: light brown well demarcated patches on face  Pertinent Positives:  Pertinent Negatives: no ochronosis noted    Additional History of Present Condition: Patient has had single treatment with Dr Hernandez with Q switched Nd:YAG and IPL done 5/20/24. She is cycling between a non hydroquinone compound with tretinoin, kojic acid, and tranexamic acid, and Triluma. Right now she is not using anything with hydroquinone. She does not think her sunscreen is mineral based.    Patient pregnant: no  Patient on hormonal birth control or other hormonal medication: no  Using sunscreen daily? yes   If so, what type? Chemical sunscreen        The patient was counseled on the diagnosis of melasma, which is caused by a combination of hormonal factors (birth control, pregnancy, etc) and the sun. Diligent sun protection is incredibly important to improve and maintain control. I recommended using a mineral (titanium or zinc) based SPF 50+sunscreen every morning, and a wide-brimmed hat whenever outdoors. The patient can also consider using an opaque foundation or tinted moisturizer in the morning, since even visible  light worsens melasma.    Patient advised that even after successful treatment, melasma is a condition that can easily come back from even one day in the sun. Referral for cosmetic therapy such as laser treatment/chemical peels also discussed.    Encouraged patient to follow up on her laser sessions as she has paid for 3. Offered to switch back to hydroquinone containing product but will d/w Dr Hernandez.      SEBORRHEIC KERATOSES  - Relevant exam: Scattered over the trunk/extremities are waxy brown to black plaques and papules with stuck on appearance and consistent dermoscopy  - Exam and clinical history consistent with seborrheic keratoses  - Counseled that these are benign growths that do not require treatment    MELANOCYTIC NEVI  -Relevant exam: Scattered over the trunk/extremities are homogenously pigmented brown macules and papules. ELM performed and without concerning findings. No outliers unless otherwise noted in today's note  - Exam and clinical history consistent with melanocytic nevi  - Counseled to return to clinic prior to scheduled appointment should any of these lesions change or should any new lesions of concern arise  - Counseled on use of sun protection daily. Reviewed latest FDA sunscreen guidelines, including use of broad spectrum (UVA and UVB blocking) sunscreen or sun protective clothing with SPF 30-50 every 2-3 hours and reapplied after exposure to water    LENTIGINES  OTHER SKIN CHANGES DUE TO CHRONIC EXPOSURE TO NONIONIZING RADIATION  - Relevant exam: Over sun exposed areas are brown macules. ELM performed and without concerning findings.  - Exam and clinical history consistent with lentigines.  - Counseled to return to clinic prior to scheduled appointment should any of these lesions change or should any new lesions of concern arise.  - Recommended use of sunscreen as above and below.    CHERRY ANGIOMAS  - Relevant exam: Scattered over the trunk/extremities are red papules  - Exam and  clinical history consistent with cherry angiomas  - Educated that these are benign      Scribe Attestation      I,:  Ekaterina Perdomo am acting as a scribe while in the presence of the attending physician.:       I,:  Ellie Townsend MD personally performed the services described in this documentation    as scribed in my presence.:

## 2024-07-25 NOTE — TELEPHONE ENCOUNTER
Spoke to the patient to reschedule her appointment per message sent from [facility], pt states she does not want to r/s it. That she called last night to confirm her appt not cancel it.

## 2024-07-25 NOTE — TELEPHONE ENCOUNTER
Patient is calling regarding cancelling an appointment.    Date/Time: 07/25 @220    Patient was rescheduled: YES [] NO [x]    Patient requesting call back to reschedule: YES [x] NO []

## 2024-07-25 NOTE — PATIENT INSTRUCTIONS
MELASMA       The patient was counseled on the diagnosis of melasma, which is caused by a combination of hormonal factors (birth control, pregnancy, etc) and the sun. Diligent sun protection is incredibly important to improve and maintain control. I recommended using a mineral (titanium or zinc) based SPF 50+sunscreen every morning, and a wide-brimmed hat whenever outdoors. The patient can also consider using an opaque foundation or tinted moisturizer in the morning, since even visible light worsens melasma.    Patient advised that even after successful treatment, melasma is a condition that can easily come back from even one day in the sun. Referral for cosmetic therapy such as laser treatment/chemical peels also discussed.      SEBORRHEIC KERATOSES  - Relevant exam: Scattered over the trunk/extremities are waxy brown to black plaques and papules with stuck on appearance and consistent dermoscopy  - Exam and clinical history consistent with seborrheic keratoses  - Counseled that these are benign growths that do not require treatment    MELANOCYTIC NEVI  -Relevant exam: Scattered over the trunk/extremities are homogenously pigmented brown macules and papules. ELM performed and without concerning findings. No outliers unless otherwise noted in today's note  - Exam and clinical history consistent with melanocytic nevi  - Counseled to return to clinic prior to scheduled appointment should any of these lesions change or should any new lesions of concern arise  - Counseled on use of sun protection daily. Reviewed latest FDA sunscreen guidelines, including use of broad spectrum (UVA and UVB blocking) sunscreen or sun protective clothing with SPF 30-50 every 2-3 hours and reapplied after exposure to water    LENTIGINES  OTHER SKIN CHANGES DUE TO CHRONIC EXPOSURE TO NONIONIZING RADIATION  - Relevant exam: Over sun exposed areas are brown macules. ELM performed and without concerning findings.  - Exam and clinical history  consistent with lentigines.  - Counseled to return to clinic prior to scheduled appointment should any of these lesions change or should any new lesions of concern arise.  - Recommended use of sunscreen as above and below.    CHERRY ANGIOMAS  - Relevant exam: Scattered over the trunk/extremities are red papules  - Exam and clinical history consistent with cherry angiomas  - Educated that these are benign

## 2024-09-26 ENCOUNTER — TELEPHONE (OUTPATIENT)
Age: 59
End: 2024-09-26

## 2024-09-26 NOTE — TELEPHONE ENCOUNTER
Ph called to pt for clarification on  medication, she states the cream that was send to [facility] when she see Dr David garcia. Rene was send, she c/o of pimples on forehead and why else can she use.    Please advise

## 2024-09-26 NOTE — TELEPHONE ENCOUNTER
Patient states that the cream that was given for the pimple on her forehead is $250. Patient is asking if she can get a sample. The phone got disconnected in the middle of the call, message being forwarded to the office.

## 2024-09-26 NOTE — TELEPHONE ENCOUNTER
Pt states that she never received the first time. Pt is unable to get at [facility] for $200.    Reason for call:   [x] Refill   [] Prior Auth  [] Other:     Office:   [] PCP/Provider -   [x] Specialty/Provider - Dermatology     Medication: Pimecrolimus 2%    Dose/Frequency: Apply topically 2 times a day    Quantity: 30 g    Pharmacy: [facility] - [place] [address]     Does the patient have enough for 3 days?   [] Yes   [x] No - Send as HP to POD

## 2024-09-30 ENCOUNTER — TELEPHONE (OUTPATIENT)
Age: 59
End: 2024-09-30

## 2024-09-30 NOTE — TELEPHONE ENCOUNTER
Patient called to confirm appointment with Dr Hernandez on 11/06.    Advised if the appointment is with the [facility] she will need to call and confirm with them.

## 2024-09-30 NOTE — TELEPHONE ENCOUNTER
Called and left a message for the patient to give us a call back so we could get her scheduled with a physician for a follow up for granuloma annulare

## 2024-10-04 ENCOUNTER — TELEPHONE (OUTPATIENT)
Age: 59
End: 2024-10-04

## 2024-10-04 NOTE — TELEPHONE ENCOUNTER
Patient called to review response about using the La Roche posay face wash after her [facility] visit.    I informed her we didn't get an answer yet.    We will call back once we have an answer.

## 2024-10-04 NOTE — TELEPHONE ENCOUNTER
Patient had laser treatment yesterday with Dr. Trevino , she would like to know if she can wash her face with la roche posay daily repair.

## 2024-10-07 ENCOUNTER — TELEPHONE (OUTPATIENT)
Age: 59
End: 2024-10-07

## 2024-10-07 NOTE — TELEPHONE ENCOUNTER
"Patient called the RX Refill Line. Message is being forwarded to the office.     Patient is requesting a message be sent to the office regarding a cream which was discussed during her visit. Patient can not recall the name of the medication and stated \"the spot is spreading\". Please review and if/when approved please send to     [id number] - [place] - [address]  [address] [address]  Phone: [phone]  Fax: [phone]      Please contact patient at [phone]    "

## 2024-10-08 ENCOUNTER — TELEPHONE (OUTPATIENT)
Dept: DERMATOLOGY | Facility: CLINIC | Age: 59
End: 2024-10-08

## 2024-10-08 DIAGNOSIS — L92.0 GRANULOMA ANNULARE: ICD-10-CM

## 2024-10-08 RX ORDER — PIMECROLIMUS 10 MG/G
CREAM TOPICAL 2 TIMES DAILY
Qty: 30 G | Refills: 3 | Status: SHIPPED | OUTPATIENT
Start: 2024-10-08 | End: 2024-11-07

## 2024-10-08 NOTE — TELEPHONE ENCOUNTER
Please let her know pimecrolimus cream has been sent to [facility] pharmacy. It is for the pink bumps on the forehead. Use a thin layer twice a day

## 2024-10-08 NOTE — TELEPHONE ENCOUNTER
Patient called requesting refill for Elidel cream. Patient made aware medication was refilled on 10/08/24 for 30 g with 3 refills to [facility] pharmacy. Patient instructed to contact the pharmacy to obtain refills of medication. Patient verbalized understanding.

## 2024-10-09 ENCOUNTER — NURSE TRIAGE (OUTPATIENT)
Age: 59
End: 2024-10-09

## 2024-10-09 NOTE — TELEPHONE ENCOUNTER
Called patient no answer. [facility] for her to call back to advise about using the face wash.     Please let her know Dr. Trevino said yes it's okay.

## 2024-10-09 NOTE — TELEPHONE ENCOUNTER
Pt calling re murad vitamin c products discussed with Dr. Trevino at [facility] appt, transferred to [employer]\[facility] [place] [facility]\"" to review. No other questins at this time.

## 2025-08-04 ENCOUNTER — DOCUMENTATION (OUTPATIENT)
Age: 60
End: 2025-08-04

## 2025-08-04 DIAGNOSIS — L81.1 MELASMA: Primary | ICD-10-CM

## 2025-08-04 RX ORDER — TRETINOIN 0.25 MG/G
CREAM TOPICAL
Qty: 30 G | Refills: 3 | Status: SHIPPED | OUTPATIENT
Start: 2025-08-04